# Patient Record
Sex: FEMALE | Race: WHITE | URBAN - METROPOLITAN AREA
[De-identification: names, ages, dates, MRNs, and addresses within clinical notes are randomized per-mention and may not be internally consistent; named-entity substitution may affect disease eponyms.]

---

## 2023-03-10 ENCOUNTER — OFFICE VISIT (OUTPATIENT)
Dept: URBAN - METROPOLITAN AREA CLINIC 24 | Facility: CLINIC | Age: 43
End: 2023-03-10
Payer: COMMERCIAL

## 2023-03-10 DIAGNOSIS — H55.00 UNSPECIFIED NYSTAGMUS: Primary | ICD-10-CM

## 2023-03-10 DIAGNOSIS — Z96.1 PRESENCE OF INTRAOCULAR LENS: ICD-10-CM

## 2023-03-10 PROCEDURE — 99203 OFFICE O/P NEW LOW 30 MIN: CPT | Performed by: STUDENT IN AN ORGANIZED HEALTH CARE EDUCATION/TRAINING PROGRAM

## 2023-03-10 PROCEDURE — 92134 CPTRZ OPH DX IMG PST SGM RTA: CPT | Performed by: STUDENT IN AN ORGANIZED HEALTH CARE EDUCATION/TRAINING PROGRAM

## 2023-03-10 ASSESSMENT — VISUAL ACUITY
OS: 20/50
OD: 20/100

## 2023-03-10 ASSESSMENT — INTRAOCULAR PRESSURE
OS: 13
OD: 12

## 2023-03-10 NOTE — IMPRESSION/PLAN
Impression: Nystagmus -- per pt congenital
-- amblyopia contributes to limited vision
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
RTC next available for TL refraction

## 2023-03-29 ENCOUNTER — OFFICE VISIT (OUTPATIENT)
Dept: URBAN - METROPOLITAN AREA CLINIC 24 | Facility: CLINIC | Age: 43
End: 2023-03-29
Payer: COMMERCIAL

## 2023-03-29 DIAGNOSIS — H52.4 PRESBYOPIA: ICD-10-CM

## 2023-03-29 DIAGNOSIS — H55.00 UNSPECIFIED NYSTAGMUS: Primary | ICD-10-CM

## 2023-03-29 ASSESSMENT — VISUAL ACUITY
OD: 20/60
OS: 20/60

## 2023-03-29 ASSESSMENT — INTRAOCULAR PRESSURE
OS: 8
OD: 8

## 2023-03-29 NOTE — IMPRESSION/PLAN
Impression: Nystagmus -- per pt congenital
-- amblyopia contributes to limited vision
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

## 2024-06-10 ENCOUNTER — OFFICE VISIT (OUTPATIENT)
Dept: URBAN - METROPOLITAN AREA CLINIC 24 | Facility: CLINIC | Age: 44
End: 2024-06-10
Payer: COMMERCIAL

## 2024-06-10 DIAGNOSIS — Z96.1 PRESENCE OF INTRAOCULAR LENS: ICD-10-CM

## 2024-06-10 DIAGNOSIS — H55.00 UNSPECIFIED NYSTAGMUS: Primary | ICD-10-CM

## 2024-06-10 DIAGNOSIS — I10 ESSENTIAL (PRIMARY) HYPERTENSION: ICD-10-CM

## 2024-06-10 DIAGNOSIS — H52.4 PRESBYOPIA: ICD-10-CM

## 2024-06-10 PROCEDURE — 92014 COMPRE OPH EXAM EST PT 1/>: CPT | Performed by: STUDENT IN AN ORGANIZED HEALTH CARE EDUCATION/TRAINING PROGRAM

## 2024-06-10 ASSESSMENT — KERATOMETRY
OS: 44.77
OD: 25.07

## 2024-06-10 ASSESSMENT — INTRAOCULAR PRESSURE
OS: 14
OD: 16

## 2024-06-10 ASSESSMENT — VISUAL ACUITY: OD: 20/70
